# Patient Record
Sex: FEMALE | Race: OTHER | NOT HISPANIC OR LATINO | Employment: UNEMPLOYED | URBAN - METROPOLITAN AREA
[De-identification: names, ages, dates, MRNs, and addresses within clinical notes are randomized per-mention and may not be internally consistent; named-entity substitution may affect disease eponyms.]

---
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
## 2023-01-18 ENCOUNTER — CLINICAL SUPPORT (OUTPATIENT)
Dept: FAMILY MEDICINE CLINIC | Facility: CLINIC | Age: 18
End: 2023-01-18

## 2023-01-18 DIAGNOSIS — Z23 ENCOUNTER FOR IMMUNIZATION: Primary | ICD-10-CM

## 2023-02-09 ENCOUNTER — TELEPHONE (OUTPATIENT)
Dept: FAMILY MEDICINE CLINIC | Facility: CLINIC | Age: 18
End: 2023-02-09

## 2023-02-09 NOTE — TELEPHONE ENCOUNTER
Mom called she took patient to an allergist for the redness around her nose   They said she needs to see dermatology   Could you please put in a referral for dermatology and call the mom when it is done thank you

## 2023-02-10 ENCOUNTER — TELEPHONE (OUTPATIENT)
Dept: FAMILY MEDICINE CLINIC | Facility: CLINIC | Age: 18
End: 2023-02-10

## 2023-02-10 DIAGNOSIS — L53.9 REDNESS OF SKIN: Primary | ICD-10-CM

## 2023-02-10 NOTE — TELEPHONE ENCOUNTER
Spoke with patients mother in regards for referral for a dermatologist and stated patient has redness around her nose and is very concerned and was made aware  Patients referral was also mailed to patients address on file

## 2023-10-11 ENCOUNTER — OFFICE VISIT (OUTPATIENT)
Dept: DERMATOLOGY | Facility: CLINIC | Age: 18
End: 2023-10-11
Payer: COMMERCIAL

## 2023-10-11 VITALS — TEMPERATURE: 98.2 F | HEIGHT: 63 IN | BODY MASS INDEX: 26.93 KG/M2 | WEIGHT: 152 LBS

## 2023-10-11 DIAGNOSIS — R21 RASH: Primary | ICD-10-CM

## 2023-10-11 DIAGNOSIS — L53.9 REDNESS OF SKIN: ICD-10-CM

## 2023-10-11 PROCEDURE — 99244 OFF/OP CNSLTJ NEW/EST MOD 40: CPT | Performed by: STUDENT IN AN ORGANIZED HEALTH CARE EDUCATION/TRAINING PROGRAM

## 2023-10-11 RX ORDER — TACROLIMUS 1 MG/G
OINTMENT TOPICAL 2 TIMES DAILY
Qty: 100 G | Refills: 2 | Status: SHIPPED | OUTPATIENT
Start: 2023-10-11

## 2023-10-11 NOTE — PATIENT INSTRUCTIONS
RASH: Granulosis rubra nasi vs rosaceaMost treatme    Physical Exam:  (Anatomic Location); (Size and Morphological Description); (Differential Diagnosis):  Nasal tip; red plaque that is tender  Pertinent Positives:  Pertinent Negatives: Additional History of Present Condition:  Reports is moist and water appears to come out. Many years present. It is not worse in sun. Pt has autism. Assessment and Plan:  Based on a thorough discussion of this condition and the management approach to it (including a comprehensive discussion of the known risks, side effects and potential benefits of treatment), the patient (family) agrees to implement the following specific plan:  Most treatments do not work well on this condition.   Apply Tacrolimus ointment twice a day to the nasal tip  Call office or my chart message if it gets worse

## 2023-10-11 NOTE — PROGRESS NOTES
[facility] Note     Patient Name: Ranjit Troncoso  Encounter Date: 10/11/23     Have you been cared for by a [facility] D[facility]atologist in the last 3 years and, if so, which description applies to you? NO. I am considered a "new" patient and must complete all patient intake questions. I am FEMALE/of child-bearing potential.    REVIEW OF SYSTEMS:  Have you recently had or currently have any of the following? Recent fever or chills? No  Any non-healing wound? No  Are you pregnant or planning to become pregnant? No  Are you currently or planning to be nursing or breast feeding? No   PAST MEDICAL HISTORY:  Have you personally ever had or currently have any of the following? If "YES," then please provide more detail. Skin cancer (such as Melanoma, Basal Cell Carcinoma, Squamous Cell Carcinoma? No  Tuberculosis, HIV/AIDS, Hepatitis B or C: No  Systemic Immunosuppression such as Diabetes, Biologic or Immunotherapy, Chemotherapy, Organ Transplantation, Bone Marrow Transplantation No  Radiation Treatment No   FAMILY HISTORY:  Any "first degree relatives" (parent, brother, sister, or child) with the following? Skin Cancer, Pancreatic or Other Cancer? No   PATIENT EXPERIENCE:    Do you want the Dermatologist to perform a COMPLETE skin exam today including a clinical examination under the "bra and underwear" areas? NO  If necessary, do we have your permission to call and leave a detailed message on your Preferred Phone number that includes your specific medical information?   Yes      No Known Allergies   Current Outpatient Medications:     benzonatate (TESSALON PERLES) 100 mg capsule, Take 1 capsule (100 mg total) by mouth 3 (three) times a day as needed for cough, Disp: 20 capsule, Rfl: 0    Cholecalciferol (D-5000) 125 MCG (5000 UT) TABS, Take 1 tablet (5,000 Units total) by mouth daily, Disp: 90 tablet, Rfl: 0    clotrimazole-betamethasone (LOTRISONE) 1-0.05 % cream, Apply topically 2 (two) times a day, Disp: 30 g, Rfl: 0    diazePAM (Valtoco 10 MG Dose) 10 MG/0.1ML LIQD, USE 1 SPRAY IN ONE NOSTRIL FOR SEIZURE OVER 5 MINUTES, Disp: , Rfl:     divalproex sodium (DEPAKOTE) 250 mg EC tablet, , Disp: , Rfl:     ferrous sulfate (FeroSul) 325 (65 Fe) mg tablet, Take 1 tablet (325 mg total) by mouth daily with breakfast, Disp: 90 tablet, Rfl: 1    lamoTRIgine (LaMICtal) 150 MG tablet, TAKE 1 TABLET(150 MG) BY MOUTH THREE TIMES DAILY, Disp: , Rfl:     levOCARNitine (CARNITOR) 330 MG tablet, , Disp: , Rfl:     topiramate (TOPAMAX) 100 mg tablet, TAKE 2 AND 1/2 TABLETS BY MOUTH TWICE DAILY, Disp: , Rfl:           Whom besides the patient is providing clinical information about today's encounter? 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 Many years present. It is not worse in sun. Pt has autism. Assessment and Plan:  Based on a thorough discussion of this condition and the management approach to it (including a comprehensive discussion of the known risks, side effects and potential benefits of treatment), the patient (family) agrees to implement the following specific plan:  Most treatments do not work well on this condition.   Apply Tacrolimus ointment twice a day to the nasal tip  Call office or my chart message if condition gets worse or not improved    Scribe Attestation      I,:  Gormania [place], MA am acting as a scribe while in the presence of the attending physician.:       I,:  Anders Du MD personally performed the services described in this documentation    as scribed in my presence.:

## 2023-10-31 ENCOUNTER — OFFICE VISIT (OUTPATIENT)
Dept: FAMILY MEDICINE CLINIC | Facility: CLINIC | Age: 18
End: 2023-10-31
Payer: COMMERCIAL

## 2023-10-31 VITALS
HEIGHT: 63 IN | DIASTOLIC BLOOD PRESSURE: 74 MMHG | TEMPERATURE: 97.5 F | WEIGHT: 154.4 LBS | BODY MASS INDEX: 27.36 KG/M2 | OXYGEN SATURATION: 99 % | SYSTOLIC BLOOD PRESSURE: 106 MMHG | HEART RATE: 60 BPM

## 2023-10-31 DIAGNOSIS — Z00.00 ROUTINE MEDICAL EXAM: Primary | ICD-10-CM

## 2023-10-31 DIAGNOSIS — E55.9 VITAMIN D DEFICIENCY: ICD-10-CM

## 2023-10-31 DIAGNOSIS — R53.83 OTHER FATIGUE: ICD-10-CM

## 2023-10-31 DIAGNOSIS — E78.49 FAMILIAL HYPERLIPIDEMIA, HIGH LDL: ICD-10-CM

## 2023-10-31 DIAGNOSIS — G40.909 SEIZURE DISORDER (HCC): ICD-10-CM

## 2023-10-31 DIAGNOSIS — R73.9 HYPERGLYCEMIA: ICD-10-CM

## 2023-10-31 DIAGNOSIS — Z00.00 ANNUAL PHYSICAL EXAM: ICD-10-CM

## 2023-10-31 DIAGNOSIS — F84.0 AUTISM: ICD-10-CM

## 2023-10-31 PROCEDURE — 99395 PREV VISIT EST AGE 18-39: CPT | Performed by: FAMILY MEDICINE

## 2023-10-31 NOTE — PROGRESS NOTES
ADULT ANNUAL [address] [facility] PRIMARY CARE [place]    NAME: Vinie Holstein  AGE: 19 y.o. SEX: female  : 2005     DATE: 10/31/2023     Assessment and Plan:routeine medical exam and other health conditions     Problem List Items Addressed This Visit          Nervous and Auditory    Seizure disorder ([address])     Stable in  care   of  neurologist  last  seizure  5  years  ago            Other    Hyperglycemia    Relevant Orders    CBC and differential    Comprehensive metabolic panel    Lipid panel    Autism     Is  at her baseline doing well  no new  issues she is  high  functional she is in  school 12th  grade in  the  special  school         Routine medical exam - Primary     Discussed  diet  exercise and life  style  modifications no complaints  no  depression         Familial hyperlipidemia, high LDL     Other Visit Diagnoses       Other fatigue        Relevant Orders    TSH, 3rd generation            Immunizations and preventive care screenings were discussed with patient today. Appropriate education was printed on patient's after visit summary. Counseling:  Dental Health: discussed importance of regular tooth brushing, flossing, and dental visits. No follow-ups on file. Chief Complaint:     Chief Complaint   Patient presents with    Annual Exam      History of Present Illness:     Adult Annual Physical   Patient here for a comprehensive physical exam. The patient reports no problems. Diet and Physical Activity  Diet/Nutrition: well balanced diet, adequate fiber intake, and adequate whole grain intake. Exercise: walking and moderate cardiovascular exercise. Depression Screening  PHQ-2/9 Depression Screening           General Health  Sleep: sleeps well. Hearing: normal - bilateral.  Vision: no vision problems. Dental: regular dental visits.        /GYN Health  Last menstrual period: last period 3 weeks  ago  Contraceptive method:  na .  History of STDs?: no.    Advanced Care Planning  Do you have an advanced directive? no  Do you have a durable medical power of ? no     Review of Systems:     Review of Systems   Constitutional:  Negative for fatigue and fever. Autistic   HENT:  Negative for congestion and sinus pressure. Eyes:  Negative for pain, discharge and itching. Respiratory:  Negative for cough and shortness of breath. Cardiovascular:  Negative for chest pain, palpitations and leg swelling. Hyperlipidemia   Gastrointestinal:  Negative for abdominal distention, abdominal pain and nausea. Endocrine: Negative for polydipsia and polyphagia. Hyperglycemia   Genitourinary:  Negative for dysuria and flank pain. Skin:  Negative for rash. Neurological:         H/o seizures   Psychiatric/Behavioral:  Negative for sleep disturbance. The patient is not nervous/anxious.        Past Medical History:     Past Medical History:   Diagnosis Date    Autistic disorder     Hyperglycemia     Right upper quadrant abdominal pain     Vitamin D deficiency       Past Surgical History:     Past Surgical History:   Procedure Laterality Date    NO PAST SURGERIES        Social History:     Social History     Socioeconomic History    Marital status: Single     Spouse name: None    Number of children: None    Years of education: None    Highest education level: None   Occupational History    None   Tobacco Use    Smoking status: Never    Smokeless tobacco: Never   Vaping Use    Vaping Use: Never used   Substance and Sexual Activity    Alcohol use: Never    Drug use: Never    Sexual activity: None   Other Topics Concern    None   Social History Narrative    · Most recent tobacco use screenin2019      · Do you currently or have you served in the [address] [place] [address] "":   No      · Were you activated, into active duty, as a member of the Crew or as a Reservist:   No     As per [employer]         What type of home do you live in: Single house    Age of your home: 100 yrs    How long have you been living there: 3 yrs    Type of heat: Forced hot air    Type of fuel: Gas    What type of toby is in your bedroom: Hardwood floor    Do you have the following in or near your home:    Air products: Window air conditioning    Pests: None    Pets: None    Basement: Dry and Unfinished              Social Determinants of Health     Financial Resource Strain: Not on file   Food Insecurity: Not on file   Transportation Needs: Not on file   Physical Activity: Insufficiently Active (1/19/2023)    Exercise Vital Sign     Days of Exercise per Week: 2 days     Minutes of Exercise per Session: 30 min   Stress: Not on file   Social Connections: Not on file   Intimate Partner Violence: Not on file   Housing Stability: Not on file      Family History:     Family History   Problem Relation Age of Onset    No Known Problems Mother     Diabetes Father     No Known Problems Sister     No Known Problems Sister       Current Medications:     Current Outpatient Medications   Medication Sig Dispense Refill    clotrimazole-betamethasone (LOTRISONE) 1-0.05 % cream Apply topically 2 (two) times a day 30 g 0    diazePAM (Valtoco 10 MG Dose) 10 MG/0.1ML LIQD USE 1 SPRAY IN ONE NOSTRIL FOR SEIZURE OVER 5 MINUTES      divalproex sodium (DEPAKOTE) 250 mg EC tablet       lamoTRIgine (LaMICtal) 150 MG tablet TAKE 1 TABLET(150 MG) BY MOUTH THREE TIMES DAILY      levOCARNitine (CARNITOR) 330 MG tablet       tacrolimus (PROTOPIC) 0.1 % ointment Apply topically 2 (two) times a day Apply to affected area on nose 100 g 2    topiramate (TOPAMAX) 100 mg tablet TAKE 2 AND 1/2 TABLETS BY MOUTH TWICE DAILY      Cholecalciferol (D-5000) 125 MCG (5000 UT) TABS Take 1 tablet (5,000 Units total) by mouth daily (Patient not taking: Reported on 10/31/2023) 90 tablet 0    ferrous sulfate (FeroSul) 325 (65 Fe) mg tablet Take 1 tablet (325 mg total) by mouth daily with breakfast (Patient not taking: Reported on 10/31/2023) 90 tablet 1     No current facility-administered medications for this visit. Allergies:     No Known Allergies   Physical Exam:     /74 (BP Location: Left arm, Patient Position: Sitting, Cuff Size: Standard)   Pulse 60   Temp 97.5 °F (36.4 °C) (Temporal)   Ht 5' 3" (1.6 m)   Wt 70 kg (154 lb 6.4 oz)   SpO2 99%   BMI 27.35 kg/m²     Physical Exam  Vitals and nursing note reviewed. Constitutional:       General: She is not in acute distress. Appearance: Normal appearance. She is not ill-appearing, toxic-appearing or diaphoretic. HENT:      Right Ear: Tympanic membrane normal.      Left Ear: Tympanic membrane normal.      Nose: Nose normal. No congestion or rhinorrhea. Mouth/Throat:      Mouth: Mucous membranes are moist.   Eyes:      Extraocular Movements: Extraocular movements intact. Conjunctiva/sclera: Conjunctivae normal.      Pupils: Pupils are equal, round, and reactive to light. Cardiovascular:      Rate and Rhythm: Normal rate and regular rhythm. Pulses: Normal pulses. Heart sounds: Normal heart sounds. No murmur heard. No gallop. Pulmonary:      Effort: Pulmonary effort is normal.      Breath sounds: Normal breath sounds. No wheezing, rhonchi or rales. Abdominal:      General: There is no distension. Palpations: Abdomen is soft. There is no mass. Tenderness: There is no abdominal tenderness. There is no right CVA tenderness, left CVA tenderness or guarding. Musculoskeletal:      Cervical back: Normal range of motion and neck supple. No rigidity or tenderness. Right lower leg: No edema. Left lower leg: No edema. Lymphadenopathy:      Cervical: No cervical adenopathy. Skin:     Findings: No erythema or rash. Neurological:      General: No focal deficit present. Mental Status: She is alert and oriented to person, place, and time.    Psychiatric:         Mood and Affect: Mood normal. Behavior: Behavior normal.          Delmis Coffman MD   [facility]

## 2023-10-31 NOTE — PROGRESS NOTES
BMI Counseling: Body mass index is 27.35 kg/m². The BMI is above normal. Nutrition recommendations include decreasing portion sizes, encouraging healthy choices of fruits and vegetables, decreasing fast food intake, consuming healthier snacks, limiting drinks that contain sugar, moderation in carbohydrate intake, increasing intake of lean protein and reducing intake of saturated and trans fat. Rationale for BMI follow-up plan is due to patient being overweight or obese.      Depression Screening and Follow-up Plan: No depression

## 2023-10-31 NOTE — ASSESSMENT & PLAN NOTE
Is  at her baseline doing well  no new  issues she is  high  functional she is in  school 12th  grade in  the  special  school

## 2023-10-31 NOTE — PATIENT INSTRUCTIONS
Wellness Visit for Adults   AMBULATORY CARE:   A wellness visit  is when you see your healthcare provider to get screened for health problems. Your healthcare provider will also give you advice on how to stay healthy. Write down your questions so you remember to ask them. Ask your healthcare provider how often you should have a wellness visit. What happens at a wellness visit:  Your healthcare provider will ask about your health, and your family history of health problems. This includes high blood pressure, heart disease, and cancer. He or she will ask if you have symptoms that concern you, if you smoke, and about your mood. You may also be asked about your intake of medicines, supplements, food, and alcohol. Any of the following may be done: Your weight  will be checked. Your height may also be checked so your body mass index (BMI) can be calculated. Your BMI shows if you are at a healthy weight. Your blood pressure  and heart rate will be checked. Your temperature may also be checked. Blood and urine tests  may be done. Blood tests may be done to check your cholesterol levels. Abnormal cholesterol levels increase your risk for heart disease and stroke. You may also need a blood or urine test to check for diabetes if you are at increased risk. Urine tests may be done to look for signs of an infection or kidney disease. A physical exam  includes checking your heartbeat and lungs with a stethoscope. Your healthcare provider may also check your skin to look for sun damage. Screening tests  may be recommended. A screening test is done to check for diseases that may not cause symptoms. The screening tests you may need depend on your age, gender, family history, and lifestyle habits. For example, colorectal screening may be recommended if you are 48years old or older. Screening tests you need if you are a woman:   A Pap smear  is used to screen for cervical cancer.  Pap smears are usually done every 3 to 5 years depending on your age. You may need them more often if you have had abnormal Pap smear test results in the past. Ask your healthcare provider how often you should have a Pap smear. A mammogram  is an x-ray of your breasts to screen for breast cancer. Experts recommend mammograms every 2 years starting at age 48 years. You may need a mammogram at age 52 years or younger if you have an increased risk for breast cancer. Talk to your healthcare provider about when you should start having mammograms and how often you need them. Vaccines you may need:   Get an influenza vaccine  every year. The influenza vaccine protects you from the flu. Several types of viruses cause the flu. The viruses change over time, so new vaccines are made each year. Get a tetanus-diphtheria (Td) booster vaccine  every 10 years. This vaccine protects you against tetanus and diphtheria. Tetanus is a severe infection that may cause painful muscle spasms and lockjaw. Diphtheria is a severe bacterial infection that causes a thick covering in the back of your mouth and throat. Get a human papillomavirus (HPV) vaccine  if you are female and aged 23 to 32 or male 23 to 24 and never received it. This vaccine protects you from HPV infection. HPV is the most common infection spread by sexual contact. HPV may also cause vaginal, penile, and anal cancers. Get a pneumococcal vaccine  if you are aged 72 years or older. The pneumococcal vaccine is an injection given to protect you from pneumococcal disease. Pneumococcal disease is an infection caused by pneumococcal bacteria. The infection may cause pneumonia, meningitis, or an ear infection. Get a shingles vaccine  if you are 60 or older, even if you have had shingles before. The shingles vaccine is an injection to protect you from the varicella-zoster virus. This is the same virus that causes chickenpox.  Shingles is a painful rash that develops in people who had chickenpox or have been exposed to the virus. How to eat healthy:  My Plate is a model for planning healthy meals. It shows the types and amounts of foods that should go on your plate. Fruits and vegetables make up about half of your plate, and grains and protein make up the other half. A serving of dairy is included on the side of your plate. The amount of calories and serving sizes you need depends on your age, gender, weight, and height. Examples of healthy foods are listed below:  Eat a variety of vegetables  such as dark green, red, and orange vegetables. You can also include canned vegetables low in sodium (salt) and frozen vegetables without added butter or sauces. Eat a variety of fresh fruits , canned fruit in 100% juice, frozen fruit, and dried fruit. Include whole grains. At least half of the grains you eat should be whole grains. Examples include whole-wheat bread, wheat pasta, brown rice, and whole-grain cereals such as oatmeal.    Eat a variety of protein foods such as seafood (fish and shellfish), lean meat, and poultry without skin (turkey and chicken). Examples of lean meats include pork leg, shoulder, or tenderloin, and beef round, sirloin, tenderloin, and extra lean ground beef. Other protein foods include eggs and egg substitutes, beans, peas, soy products, nuts, and seeds. Choose low-fat dairy products such as skim or 1% milk or low-fat yogurt, cheese, and cottage cheese. Limit unhealthy fats  such as butter, hard margarine, and shortening. Exercise:  Exercise at least 30 minutes per day on most days of the week. Some examples of exercise include walking, biking, dancing, and swimming. You can also fit in more physical activity by taking the stairs instead of the elevator or parking farther away from stores. Include muscle strengthening activities 2 days each week. Regular exercise provides many health benefits.  It helps you manage your weight, and decreases your risk for type 2 diabetes, heart disease, stroke, and high blood pressure. Exercise can also help improve your mood. Ask your healthcare provider about the best exercise plan for you. General health and safety guidelines:   Do not smoke. Nicotine and other chemicals in cigarettes and cigars can cause lung damage. Ask your healthcare provider for information if you currently smoke and need help to quit. E-cigarettes or smokeless tobacco still contain nicotine. Talk to your healthcare provider before you use these products. Limit alcohol. A drink of alcohol is 12 ounces of beer, 5 ounces of wine, or 1½ ounces of liquor. Lose weight, if needed. Being overweight increases your risk of certain health conditions. These include heart disease, high blood pressure, type 2 diabetes, and certain types of cancer. Protect your skin. Do not sunbathe or use tanning beds. Use sunscreen with a SPF 15 or higher. Apply sunscreen at least 15 minutes before you go outside. Reapply sunscreen every 2 hours. Wear protective clothing, hats, and sunglasses when you are outside. Drive safely. Always wear your seatbelt. Make sure everyone in your car wears a seatbelt. A seatbelt can save your life if you are in an accident. Do not use your cell phone when you are driving. This could distract you and cause an accident. Pull over if you need to make a call or send a text message. Practice safe sex. Use latex condoms if are sexually active and have more than one partner. Your healthcare provider may recommend screening tests for sexually transmitted infections (STIs). Wear helmets, lifejackets, and protective gear. Always wear a helmet when you ride a bike or motorcycle, go skiing, or play sports that could cause a head injury. Wear protective equipment when you play sports. Wear a lifejacket when you are on a boat or doing water sports.     © Copyright [employer] 2023 Information is for End User's use only and may not be sold, redistributed or otherwise used for commercial purposes. The above information is an  only. It is not intended as medical advice for individual conditions or treatments. Talk to your doctor, nurse or pharmacist before following any medical regimen to see if it is safe and effective for you. Cholesterol and Your Health   AMBULATORY CARE:   Cholesterol  is a waxy, fat-like substance. Your body uses cholesterol to make hormones and new cells, and to protect nerves. Cholesterol is made by your body. It also comes from certain foods you eat, such as meat and dairy products. Your healthcare provider can help you set goals for your cholesterol levels. Your provider can help you create a plan to meet your goals. Cholesterol level goals: Your cholesterol level goals depend on your risk for heart disease, your age, and your other health conditions. The following are general guidelines: Total cholesterol  includes low-density lipoprotein (LDL), high-density lipoprotein (HDL), and triglyceride levels. The total cholesterol level should be lower than 200 mg/dL and is best at about 150 mg/dL. LDL cholesterol  is called bad cholesterol  because it forms plaque in your arteries. As plaque builds up, your arteries become narrow, and less blood flows through. When plaque decreases blood flow to your heart, you may have chest pain. If plaque completely blocks an artery that brings blood to your heart, you may have a heart attack. Plaque can break off and form blood clots. Blood clots may block arteries in your brain and cause a stroke. The level should be less than 130 mg/dL and is best at about 100 mg/dL. HDL cholesterol  is called good cholesterol  because it helps remove LDL cholesterol from your arteries. It does this by attaching to LDL cholesterol and carrying it to your liver. Your liver breaks down LDL cholesterol so your body can get rid of it.  High levels of HDL cholesterol can help prevent a heart attack and stroke. Low levels of HDL cholesterol can increase your risk for heart disease, heart attack, and stroke. The level should be at least 40 mg/dL in males or at least 50 mg/dL in females. Triglycerides  are a type of fat that store energy from foods you eat. High levels of triglycerides also cause plaque buildup. This can increase your risk for a heart attack or stroke. If your triglyceride level is high, your LDL cholesterol level may also be high. The level should be less than 150 mg/dL. Any of the following can increase your risk for high cholesterol:   Smoking or drinking large amounts of alcohol    Having overweight or obesity, or not getting enough exercise    A medical condition such as hypertension (high blood pressure) or diabetes    A family history of high cholesterol    Age older than 72    What you need to know about having your cholesterol levels checked: Adults 21to 39years of age should have their cholesterol levels checked every 4 to 6 years. Adults 45 years or older should have their cholesterol checked every 1 to 2 years. You may need your cholesterol checked more often, or at a younger age, if you have risk factors for heart disease. You may also need to have your cholesterol checked more often if you have other health conditions, such as diabetes. Blood tests are used to check cholesterol levels. Blood tests measure your levels of triglycerides, LDL cholesterol, and HDL cholesterol. How healthy fats affect your cholesterol levels:  Healthy fats, also called unsaturated fats, help lower LDL cholesterol and triglyceride levels. Healthy fats include the following:  Monounsaturated fats  are found in foods such as olive oil, canola oil, avocado, nuts, and olives. Polyunsaturated fats,  such as omega 3 fats, are found in fish, such as salmon, trout, and tuna. They can also be found in plant foods such as flaxseed, walnuts, and soybeans.     How unhealthy fats affect your cholesterol levels: Unhealthy fats increase LDL cholesterol and triglyceride levels. They are found in foods high in cholesterol, saturated fat, and trans fat:  Cholesterol  is found in eggs, dairy, and meat. Saturated fat  is found in butter, cheese, ice cream, whole milk, and coconut oil. Saturated fat is also found in meat, such as sausage, hot dogs, and bologna. Trans fat  is found in liquid oils and is used in fried and baked foods. Foods that contain trans fats include chips, crackers, muffins, sweet rolls, microwave popcorn, and cookies. Treatment  for high cholesterol will also decrease your risk of heart disease, heart attack, and stroke. Treatment may include any of the following:  Lifestyle changes  may include food, exercise, weight loss, and quitting smoking. You may also need to decrease the amount of alcohol you drink. Your healthcare provider will want you to start with lifestyle changes. Other treatment may be added if lifestyle changes are not enough. Your healthcare provider may recommend you work with a team to manage hyperlipidemia. The team may include medical experts such as a dietitian, an exercise or physical therapist, and a behavior therapist. Your family members may be included in helping you create lifestyle changes. Medicines  may be given to lower your LDL cholesterol, triglyceride levels, or total cholesterol level. You may need medicines to lower your cholesterol if any of the following is true:    You have a history of stroke, TIA, unstable angina, or a heart attack. Your LDL cholesterol level is 190 mg/dL or higher. You are age 36 to 76 years, have diabetes or heart disease risk factors, and your LDL cholesterol is 70 mg/dL or higher. Supplements  include fish oil, red yeast rice, and garlic. Fish oil may help lower your triglyceride and LDL cholesterol levels. It may also increase your HDL cholesterol level.  Red yeast rice may help decrease your total cholesterol level and LDL cholesterol level. Garlic may help lower your total cholesterol level. Do not take any supplements without talking to your healthcare provider. Food changes you can make to lower your cholesterol levels:  A dietitian can help you create a healthy eating plan. Your dietitian can show you how to read food labels and choose foods low in saturated fat, trans fats, and cholesterol. Decrease the total amount of fat you eat. Choose lean meats, fat-free or 1% fat milk, and low-fat dairy products, such as yogurt and cheese. Try to limit or avoid red meats. Limit or do not eat fried foods or baked goods, such as cookies. Replace unhealthy fats with healthy fats. Cook foods in olive oil or canola oil. Choose soft margarines that are low in saturated fat and trans fat. Seeds, nuts, and avocados are other examples of healthy fats. Eat foods with omega-3 fats. Examples include salmon, tuna, mackerel, walnuts, and flaxseed. Eat fish 2 times per week. Pregnant women should not eat fish that have high levels of mercury, such as shark, swordfish, and tavia mackerel. Increase the amount of high-fiber foods you eat. High-fiber foods can help lower your LDL cholesterol. Aim to get between 20 and 30 grams of fiber each day. Fruits and vegetables are high in fiber. Eat at least 5 servings each day. Other high-fiber foods are whole-grain or whole-wheat breads, pastas, or cereals, and brown rice. Eat 3 ounces of whole-grain foods each day. Increase fiber slowly. You may have abdominal discomfort, bloating, and gas if you add fiber to your diet too quickly. Eat healthy protein foods. Examples include low-fat dairy products, skinless chicken and turkey, fish, and nuts. Limit foods and drinks that are high in sugar. Your dietitian or healthcare provider can help you create daily limits for high-sugar foods and drinks. The limit may be lower if you have diabetes or another health condition.  Limits can also help you lose weight if needed. Lifestyle changes you can make to lower your cholesterol levels:   Maintain a healthy weight. Ask your healthcare provider what a healthy weight is for you. Ask your provider to help you create a weight loss plan if needed. Weight loss can decrease your total cholesterol and triglyceride levels. Weight loss may also help keep your blood pressure at a healthy level. Be physically active throughout the day. Physical activity, such as exercise, can help lower your total cholesterol level and maintain a healthy weight. Physical activity can also help increase your HDL cholesterol level. Work with your healthcare provider to create an program that is right for you. Get at least 30 to 40 minutes of moderate physical activity most days of the week. Examples of exercise include brisk walking, swimming, or biking. Also include strength training at least 2 times each week. Your healthcare providers can help you create a physical activity plan. Do not smoke. Nicotine and other chemicals in cigarettes and cigars can raise your cholesterol levels. Ask your healthcare provider for information if you currently smoke and need help to quit. E-cigarettes or smokeless tobacco still contain nicotine. Talk to your healthcare provider before you use these products. Limit or do not drink alcohol. Alcohol can increase your triglyceride levels. Ask your healthcare provider before you drink alcohol. Ask how much is okay for you to drink in 24 hours or 1 week. Follow up with your doctor as directed:  Write down your questions so you remember to ask them during your visits. © Copyright [employer] 2023 Information is for End User's use only and may not be sold, redistributed or otherwise used for commercial purposes. The above information is an  only. It is not intended as medical advice for individual conditions or treatments.  Talk to your doctor, nurse or pharmacist before following any medical regimen to see if it is safe and effective for you.

## 2023-11-03 NOTE — PROGRESS NOTES
Virtual Regular Visit    Verification of patient location:    Patient is located in the following state in which I hold an active license [place]      Assessment/Plan:as  Below -    Problem List Items Addressed This Visit        Other    COVID-19 virus detected - Primary     She   Was  Tested  Positive   By  The  Home  Test  2  Days   Ago  She   Is  C/o  Mild  Body  Aches  And  Cough  No  Fever  Sob or  Cp    She is  Eating  Well  No  Gi  Symptoms   Discussed  Precautions  And  Care    Supportive  And  Symptomatic  Treatment   Return  Parameters  Discussed          Cough     Discussed   Supportive  Treatment   Started  On  z  Pack  And  tessalon  parls   Return  Parameters  discussed                    Reason for visit is   Chief Complaint   Patient presents with    COVID-19    Virtual Regular Visit        Encounter provider Roselyn Recinos MD    Provider located at [address] [place] [address] [address] [facility] [address] [phone]      Recent Visits  No visits were found meeting these conditions  Showing recent visits within past 7 days and meeting all other requirements  Today's Visits  Date Type Provider Dept   01/11/22 Telemedicine Roselyn Recinos MD [address] today's visits and meeting all other requirements  Future Appointments  No visits were found meeting these conditions  Showing future appointments within next 150 days and meeting all other requirements       The patient was identified by name and date of birth  Markie Pena was informed that this is a telemedicine visit and that the visit is being conducted through [address] [place] [address] Now and patient was informed that this is a secure, HIPAA-compliant platform  She agrees to proceed     My office door was closed  No one else was in the room  She acknowledged consent and understanding of privacy and security of the video platform   The patient has agreed to participate and understands they can discontinue the visit at any time  Patient is aware this is a billable service  Subjective  Maci Bey is a 23 y o  female video  visit  HPI   I  Had  The  Video  Visit  With  Her  And   Her  Parents    For    Having   Myalgia  And  Cough    She  Denies   Any  Fever  Sob  Or  Wheezing   Eating  Well   No GI  Symptoms   She  Is  Been  Diagnosed   With  covid  On  The  Home  Test   Her  Whole  Family  Is  Positive   But  Stable   Past Medical History:   Diagnosis Date    Autistic disorder     Hyperglycemia     Right upper quadrant abdominal pain     Vitamin D deficiency        Past Surgical History:   Procedure Laterality Date    NO PAST SURGERIES         Current Outpatient Medications   Medication Sig Dispense Refill    Cholecalciferol (D-5000) 125 MCG (5000 UT) TABS Take 1 tablet (5,000 Units total) by mouth daily (Patient not taking: Reported on 12/27/2021 ) 90 tablet 0    clotrimazole-betamethasone (LOTRISONE) 1-0 05 % cream Apply topically 2 (two) times a day 30 g 0    divalproex sodium (DEPAKOTE) 250 mg EC tablet  (Patient not taking: Reported on 11/29/2021 )      divalproex sodium (DEPAKOTE) 500 mg EC tablet       docusate sodium (COLACE) 100 mg capsule TAKE 1 CAPSULE BY MOUTH TWICE A DAY 60 capsule 0    FeroSul 325 (65 Fe) MG tablet TAKE 1 TABLET(325 MG TOTAL) BY MOUTH TWICE DAILY 166 tablet 0    folic acid (FOLVITE) 1 mg tablet       lamoTRIgine (LaMICtal) 150 MG tablet TAKE 1 TABLET(150 MG) BY MOUTH THREE TIMES DAILY      levOCARNitine (CARNITOR) 330 MG tablet       potassium chloride (MICRO-K) 10 MEQ CR capsule Take 1 capsule (10 mEq total) by mouth daily for 5 days (Patient not taking: Reported on 12/27/2021 ) 5 capsule 0    topiramate (TOPAMAX) 100 mg tablet TAKE 2 AND 1/2 TABLETS BY MOUTH TWICE DAILY       No current facility-administered medications for this visit  No Known Allergies    Review of Systems   Constitutional: Positive for fatigue   Negative for fever    HENT: Negative for congestion, postnasal drip and sinus pain  Eyes: Negative for discharge and itching  Respiratory: Positive for cough  Negative for shortness of breath and wheezing  Cardiovascular: Negative for chest pain, palpitations and leg swelling  Gastrointestinal: Negative for abdominal distention, abdominal pain, diarrhea and nausea  Endocrine: Negative for cold intolerance and heat intolerance  Genitourinary: Negative for dysuria, flank pain and pelvic pain  Musculoskeletal: Negative for arthralgias  Skin: Negative for rash  Video Exam    Vitals:    01/11/22 1856   Weight: 68 kg (150 lb)   Height: 5' 2 75" (1 594 m)       Physical Exam  Constitutional:       General: She is not in acute distress  Appearance: Normal appearance  She is not ill-appearing, toxic-appearing or diaphoretic  HENT:      Head: Normocephalic and atraumatic  Nose: Nose normal       Mouth/Throat:      Mouth: Mucous membranes are moist    Pulmonary:      Effort: Pulmonary effort is normal    Musculoskeletal:      Cervical back: Normal range of motion  Skin:     Findings: No erythema or rash  Neurological:      General: No focal deficit present  Mental Status: She is alert and oriented to person, place, and time  Psychiatric:         Mood and Affect: Mood normal          Behavior: Behavior normal          Thought Content: Thought content normal           I spent 30 minutes directly with the patient during this visit    VIRTUAL VISIT DISCLAIMER      Harmony Marcelo verbally agrees to participate in [employer]  Pt is aware that [employer] could be limited without vital signs or the ability to perform a full hands-on physical Eber Balta understands she or the provider may request at any time to terminate the video visit and request the patient to seek care or treatment in person  none

## 2024-02-21 PROBLEM — Z00.00 ROUTINE MEDICAL EXAM: Status: RESOLVED | Noted: 2020-09-09 | Resolved: 2024-02-21

## 2024-04-27 ENCOUNTER — APPOINTMENT (OUTPATIENT)
Dept: LAB | Facility: HOSPITAL | Age: 19
End: 2024-04-27
Payer: COMMERCIAL

## 2024-04-27 DIAGNOSIS — R53.83 OTHER FATIGUE: ICD-10-CM

## 2024-04-27 DIAGNOSIS — R73.9 HYPERGLYCEMIA: ICD-10-CM

## 2024-04-27 DIAGNOSIS — E55.9 VITAMIN D DEFICIENCY: ICD-10-CM

## 2024-04-27 LAB
25(OH)D3 SERPL-MCNC: 31.6 NG/ML (ref 30–100)
ALBUMIN SERPL BCP-MCNC: 4.5 G/DL (ref 3.5–5)
ALP SERPL-CCNC: 38 U/L (ref 34–104)
ALT SERPL W P-5'-P-CCNC: 7 U/L (ref 7–52)
ANION GAP SERPL CALCULATED.3IONS-SCNC: 8 MMOL/L (ref 4–13)
AST SERPL W P-5'-P-CCNC: 10 U/L (ref 13–39)
BASOPHILS # BLD AUTO: 0.04 THOUSANDS/ÂΜL (ref 0–0.1)
BASOPHILS NFR BLD AUTO: 1 % (ref 0–1)
BILIRUB SERPL-MCNC: 0.28 MG/DL (ref 0.2–1)
BUN SERPL-MCNC: 12 MG/DL (ref 5–25)
CALCIUM SERPL-MCNC: 9.5 MG/DL (ref 8.4–10.2)
CHLORIDE SERPL-SCNC: 109 MMOL/L (ref 96–108)
CHOLEST SERPL-MCNC: 127 MG/DL
CO2 SERPL-SCNC: 21 MMOL/L (ref 21–32)
CREAT SERPL-MCNC: 0.72 MG/DL (ref 0.6–1.3)
EOSINOPHIL # BLD AUTO: 0.08 THOUSAND/ÂΜL (ref 0–0.61)
EOSINOPHIL NFR BLD AUTO: 1 % (ref 0–6)
ERYTHROCYTE [DISTWIDTH] IN BLOOD BY AUTOMATED COUNT: 12.8 % (ref 11.6–15.1)
EST. AVERAGE GLUCOSE BLD GHB EST-MCNC: 111 MG/DL
GFR SERPL CREATININE-BSD FRML MDRD: 120 ML/MIN/1.73SQ M
GLUCOSE P FAST SERPL-MCNC: 88 MG/DL (ref 65–99)
HBA1C MFR BLD: 5.5 %
HCT VFR BLD AUTO: 41.7 % (ref 34.8–46.1)
HDLC SERPL-MCNC: 44 MG/DL
HGB BLD-MCNC: 13.3 G/DL (ref 11.5–15.4)
IMM GRANULOCYTES # BLD AUTO: 0.02 THOUSAND/UL (ref 0–0.2)
IMM GRANULOCYTES NFR BLD AUTO: 0 % (ref 0–2)
LDLC SERPL CALC-MCNC: 66 MG/DL (ref 0–100)
LYMPHOCYTES # BLD AUTO: 2.63 THOUSANDS/ÂΜL (ref 0.6–4.47)
LYMPHOCYTES NFR BLD AUTO: 37 % (ref 14–44)
MCH RBC QN AUTO: 28.7 PG (ref 26.8–34.3)
MCHC RBC AUTO-ENTMCNC: 31.9 G/DL (ref 31.4–37.4)
MCV RBC AUTO: 90 FL (ref 82–98)
MONOCYTES # BLD AUTO: 0.45 THOUSAND/ÂΜL (ref 0.17–1.22)
MONOCYTES NFR BLD AUTO: 6 % (ref 4–12)
NEUTROPHILS # BLD AUTO: 3.86 THOUSANDS/ÂΜL (ref 1.85–7.62)
NEUTS SEG NFR BLD AUTO: 55 % (ref 43–75)
NONHDLC SERPL-MCNC: 83 MG/DL
NRBC BLD AUTO-RTO: 0 /100 WBCS
PLATELET # BLD AUTO: 187 THOUSANDS/UL (ref 149–390)
PMV BLD AUTO: 11.1 FL (ref 8.9–12.7)
POTASSIUM SERPL-SCNC: 3.5 MMOL/L (ref 3.5–5.3)
PROT SERPL-MCNC: 7.3 G/DL (ref 6.4–8.4)
RBC # BLD AUTO: 4.64 MILLION/UL (ref 3.81–5.12)
SODIUM SERPL-SCNC: 138 MMOL/L (ref 135–147)
TRIGL SERPL-MCNC: 87 MG/DL
TSH SERPL DL<=0.05 MIU/L-ACNC: 2.47 UIU/ML (ref 0.45–4.5)
WBC # BLD AUTO: 7.08 THOUSAND/UL (ref 4.31–10.16)

## 2024-04-27 PROCEDURE — 84443 ASSAY THYROID STIM HORMONE: CPT

## 2024-04-27 PROCEDURE — 36415 COLL VENOUS BLD VENIPUNCTURE: CPT

## 2024-04-27 PROCEDURE — 82306 VITAMIN D 25 HYDROXY: CPT

## 2024-04-27 PROCEDURE — 85025 COMPLETE CBC W/AUTO DIFF WBC: CPT

## 2024-04-27 PROCEDURE — 83036 HEMOGLOBIN GLYCOSYLATED A1C: CPT

## 2024-04-27 PROCEDURE — 80053 COMPREHEN METABOLIC PANEL: CPT

## 2024-04-27 PROCEDURE — 80061 LIPID PANEL: CPT

## 2024-05-14 ENCOUNTER — OFFICE VISIT (OUTPATIENT)
Dept: FAMILY MEDICINE CLINIC | Facility: CLINIC | Age: 19
End: 2024-05-14
Payer: COMMERCIAL

## 2024-05-14 VITALS
BODY MASS INDEX: 27.35 KG/M2 | TEMPERATURE: 97.3 F | OXYGEN SATURATION: 98 % | HEART RATE: 80 BPM | SYSTOLIC BLOOD PRESSURE: 108 MMHG | DIASTOLIC BLOOD PRESSURE: 66 MMHG | WEIGHT: 154.4 LBS

## 2024-05-14 DIAGNOSIS — E55.9 VITAMIN D DEFICIENCY: ICD-10-CM

## 2024-05-14 DIAGNOSIS — G40.909 SEIZURE DISORDER (HCC): Primary | ICD-10-CM

## 2024-05-14 DIAGNOSIS — E78.49 FAMILIAL HYPERLIPIDEMIA, HIGH LDL: ICD-10-CM

## 2024-05-14 DIAGNOSIS — D50.9 IRON DEFICIENCY ANEMIA, UNSPECIFIED IRON DEFICIENCY ANEMIA TYPE: ICD-10-CM

## 2024-05-14 DIAGNOSIS — R73.9 HYPERGLYCEMIA: ICD-10-CM

## 2024-05-14 DIAGNOSIS — F84.0 AUTISM: ICD-10-CM

## 2024-05-14 PROCEDURE — 99214 OFFICE O/P EST MOD 30 MIN: CPT | Performed by: FAMILY MEDICINE

## 2024-05-14 NOTE — PROGRESS NOTES
Assessment/Plan:as  below         Problem List Items Addressed This Visit          Nervous and Auditory    Seizure disorder (HCC) - Primary     Stable  she  is  in care of  neurologist            Behavioral Health    Autism     At her baseline doing well goes  to  special  school             Blood    Iron deficiency anemia     Reviewed  stable            Other    Hyperglycemia     A1c 5.5         Vitamin D deficiency     normal         Familial hyperlipidemia, high LDL     labs reviewed   discussed  diet  exorcise  and life  style  modifications               Subjective:      Patient ID: Nicole Murry is a 18 y.o. female.    HPI  Came in  with her mother to  follow up on her labs    her  lipids   glucose  and  hb  is  normal now  she  ha s hyperglycemia a nd  hyperlipidemia  much  stable now  autism at  baseline  she  is  high  functioning   she is in  acre of  dermatologist  for her  skin pigentation  The following portions of the patient's history were reviewed and updated as appropriate:   Past Medical History:  She has a past medical history of Autistic disorder, Hyperglycemia, Right upper quadrant abdominal pain, and Vitamin D deficiency.,  _______________________________________________________________________  Medical Problems:  does not have any pertinent problems on file.,  _______________________________________________________________________  Past Surgical History:   has a past surgical history that includes No past surgeries.,  _______________________________________________________________________  Family History:  family history includes Diabetes in her father; No Known Problems in her mother, sister, and sister.,  _______________________________________________________________________  Social History:   reports that she has never smoked. She has never used smokeless tobacco. She reports that she does not drink alcohol and does not use  drugs.,  _______________________________________________________________________  Allergies:  has No Known Allergies..  _______________________________________________________________________  Current Outpatient Medications   Medication Sig Dispense Refill    clotrimazole-betamethasone (LOTRISONE) 1-0.05 % cream Apply topically 2 (two) times a day 30 g 0    diazePAM (Valtoco 10 MG Dose) 10 MG/0.1ML LIQD USE 1 SPRAY IN ONE NOSTRIL FOR SEIZURE OVER 5 MINUTES      divalproex sodium (DEPAKOTE) 250 mg EC tablet       lamoTRIgine (LaMICtal) 150 MG tablet TAKE 1 TABLET(150 MG) BY MOUTH THREE TIMES DAILY      levOCARNitine (CARNITOR) 330 MG tablet       tacrolimus (PROTOPIC) 0.1 % ointment Apply topically 2 (two) times a day Apply to affected area on nose 100 g 2    topiramate (TOPAMAX) 100 mg tablet TAKE 2 AND 1/2 TABLETS BY MOUTH TWICE DAILY      Cholecalciferol (D-5000) 125 MCG (5000 UT) TABS Take 1 tablet (5,000 Units total) by mouth daily (Patient not taking: Reported on 10/31/2023) 90 tablet 0    ferrous sulfate (FeroSul) 325 (65 Fe) mg tablet Take 1 tablet (325 mg total) by mouth daily with breakfast (Patient not taking: Reported on 10/31/2023) 90 tablet 1     No current facility-administered medications for this visit.     _______________________________________________________________________  Review of Systems   Constitutional:  Negative for fatigue and fever.   HENT:  Negative for congestion and postnasal drip.    Eyes:  Negative for pain, discharge and itching.   Respiratory:  Negative for cough and shortness of breath.    Cardiovascular:  Negative for chest pain, palpitations and leg swelling.        Hyperlipidemia  stable   Endocrine: Negative for cold intolerance, heat intolerance and polydipsia.        Anemia  stable hyperglycemia  stable   Genitourinary:  Negative for dysuria and flank pain.   Musculoskeletal:  Negative for arthralgias and back pain.   Skin:         Pigmentation on nose    Neurological:   Negative for dizziness and headaches.   Psychiatric/Behavioral:  Negative for sleep disturbance. The patient is not nervous/anxious.          Objective:  Vitals:    05/14/24 1838   BP: 108/66   BP Location: Left arm   Patient Position: Sitting   Cuff Size: Standard   Pulse: 80   Temp: (!) 97.3 °F (36.3 °C)   TempSrc: Temporal   SpO2: 98%   Weight: 70 kg (154 lb 6.4 oz)     Body mass index is 27.35 kg/m².     Physical Exam  Vitals and nursing note reviewed.   Constitutional:       General: She is not in acute distress.     Appearance: Normal appearance. She is not ill-appearing or toxic-appearing.   HENT:      Head: Normocephalic.      Nose: Nose normal. No congestion.      Mouth/Throat:      Mouth: Mucous membranes are moist.      Pharynx: Oropharynx is clear. No oropharyngeal exudate.   Eyes:      Extraocular Movements: Extraocular movements intact.      Conjunctiva/sclera: Conjunctivae normal.      Pupils: Pupils are equal, round, and reactive to light.   Cardiovascular:      Rate and Rhythm: Normal rate and regular rhythm.      Pulses: Normal pulses.      Heart sounds: Normal heart sounds. No murmur heard.     No gallop.   Pulmonary:      Effort: Pulmonary effort is normal. No respiratory distress.      Breath sounds: Normal breath sounds. No wheezing or rhonchi.   Abdominal:      General: There is no distension.      Palpations: Abdomen is soft. There is no mass.      Tenderness: There is no abdominal tenderness. There is no rebound.   Musculoskeletal:      Cervical back: Normal range of motion and neck supple. No rigidity or tenderness.      Right lower leg: No edema.      Left lower leg: No edema.   Lymphadenopathy:      Cervical: No cervical adenopathy.   Skin:     Findings: No erythema or rash.   Neurological:      General: No focal deficit present.      Mental Status: She is alert and oriented to person, place, and time.   Psychiatric:         Mood and Affect: Mood normal.         Behavior: Behavior normal.          Thought Content: Thought content normal.

## 2024-11-06 ENCOUNTER — OFFICE VISIT (OUTPATIENT)
Dept: DERMATOLOGY | Facility: CLINIC | Age: 19
End: 2024-11-06
Payer: COMMERCIAL

## 2024-11-06 VITALS — BODY MASS INDEX: 27.21 KG/M2 | WEIGHT: 153.6 LBS | TEMPERATURE: 98.4 F | HEIGHT: 63 IN

## 2024-11-06 DIAGNOSIS — L53.9 REDNESS OF SKIN: Primary | ICD-10-CM

## 2024-11-06 DIAGNOSIS — R21 RASH: ICD-10-CM

## 2024-11-06 DIAGNOSIS — L70.9 ACNE, UNSPECIFIED ACNE TYPE: ICD-10-CM

## 2024-11-06 PROCEDURE — 99214 OFFICE O/P EST MOD 30 MIN: CPT | Performed by: STUDENT IN AN ORGANIZED HEALTH CARE EDUCATION/TRAINING PROGRAM

## 2024-11-06 RX ORDER — TRETINOIN 0.25 MG/G
CREAM TOPICAL
Status: CANCELLED | OUTPATIENT
Start: 2024-11-06

## 2024-11-06 RX ORDER — CLINDAMYCIN PHOSPHATE 10 UG/ML
LOTION TOPICAL
Qty: 60 ML | Refills: 5 | Status: SHIPPED | OUTPATIENT
Start: 2024-11-06

## 2024-11-06 NOTE — PATIENT INSTRUCTIONS
RASH: Granulosis rubra nasi + acne          Assessment and Plan:  Based on a thorough discussion of this condition and the management approach to it (including a comprehensive discussion of the known risks, side effects and potential benefits of treatment), the patient (family) agrees to implement the following specific plan:  Most treatments do not work well on this condition.  Apply aluminum chloride to the nose twice a day  Apply clobetasol lotion to Areas affected by acne   Neutrogena Salicylic face wash can be purchased Over the counter  Over the counter Differin Gel. Apply a pea size amount to the face.   Contact us if things do not improve

## 2024-11-06 NOTE — PROGRESS NOTES
"[facility] Note     Patient Name: Nicole Murry  Encounter Date: 11/6/2024     Have you been cared for by a [facility] Dermatologist in the last 3 years and, if so, which description applies to you?    Yes.  I have been here within the last 3 years, and my medical history has NOT changed since that time.  I am FEMALE/of child-bearing potential.    REVIEW OF SYSTEMS:  Have you recently had or currently have any of the following? No changes in my recent health.   PAST MEDICAL HISTORY:  Have you personally ever had or currently have any of the following?  If \"YES,\" then please provide more detail. No changes in my medical history.   HISTORY OF IMMUNOSUPPRESSION: Do you have a history of any of the following:  Systemic Immunosuppression such as Diabetes, Biologic or Immunotherapy, Chemotherapy, Organ Transplantation, Bone Marrow Transplantation or Prednisone?  No     Answering \"YES\" requires the addition of the dotphrase \"IMMUNOSUPPRESSED\" as the first diagnosis of the patient's visit.   FAMILY HISTORY:  Any \"first degree relatives\" (parent, brother, sister, or child) with the following?    No changes in my family's known health.   PATIENT EXPERIENCE:    Do you want the Dermatologist to perform a COMPLETE skin exam today including a clinical examination under the \"bra and underwear\" areas?  NO  If necessary, do we have your permission to call and leave a detailed message on your Preferred Phone number that includes your specific medical information?  Yes      No Known Allergies   Current Outpatient Medications:     Cholecalciferol (D-5000) 125 MCG (5000 UT) TABS, Take 1 tablet (5,000 Units total) by mouth daily (Patient not taking: Reported on 10/31/2023), Disp: 90 tablet, Rfl: 0    clotrimazole-betamethasone (LOTRISONE) 1-0.05 % cream, Apply topically 2 (two) times a day, Disp: 30 g, Rfl: 0    diazePAM (Valtoco 10 MG Dose) 10 MG/0.1ML LIQD, USE 1 SPRAY IN ONE NOSTRIL FOR SEIZURE OVER 5 MINUTES, Disp: " , Rfl:     divalproex sodium (DEPAKOTE) 250 mg EC tablet, , Disp: , Rfl:     ferrous sulfate (FeroSul) 325 (65 Fe) mg tablet, Take 1 tablet (325 mg total) by mouth daily with breakfast (Patient not taking: Reported on 10/31/2023), Disp: 90 tablet, Rfl: 1    lamoTRIgine (LaMICtal) 150 MG tablet, TAKE 1 TABLET(150 MG) BY MOUTH THREE TIMES DAILY, Disp: , Rfl:     levOCARNitine (CARNITOR) 330 MG tablet, , Disp: , Rfl:     tacrolimus (PROTOPIC) 0.1 % ointment, Apply topically 2 (two) times a day Apply to affected area on nose, Disp: 100 g, Rfl: 2    topiramate (TOPAMAX) 100 mg tablet, TAKE 2 AND 1/2 TABLETS BY MOUTH TWICE DAILY, Disp: , Rfl:           Whom besides the patient is providing clinical information about today's encounter?     Spouse/Guardian provided history (patient is a poor historian)    Physical Exam and Assessment/Plan by Diagnosis:      RASH: Granulosis rubra nasi     Physical Exam:  (Anatomic Location); (Size and Morphological Description); (Differential Diagnosis):  Nasal tip; red patch that is tender  Pertinent Positives:  Pertinent Negatives:     Additional History of Present Condition:  Reports redness and tenderness of nose. Also notes gets very sweaty. Has been present for years, patient's mother noting it maybe appeared around age 8. It is not worse in sun. Pt has autism. Patient mom says the Tacrolimus ointment was used for 2 months but they stopped because were not seeing improvement.     Assessment and Plan:  Based on a thorough discussion of this condition and the management approach to it (including a comprehensive discussion of the known risks, side effects and potential benefits of treatment), the patient (family) agrees to implement the following specific plan:  Most treatments do not work well on this condition.  Apply aluminum chloride to the nose twice a day    ACNE VULGARIS    Physical Exam:  Anatomic Locations Involved: Face  Global Assessment: ALMOST CLEAR: A few scattered  "comedones and a few small inflammatory papules.   Scarring Present? NONE  Pertinent Positives:  Pertinent Negatives:    Additional History of Present Condition:  patient's mother notes pimples come and go, especially on patient's cheeks. It is bothersome for patient.       TODAY'S PLAN:     PRESCRIPTION MANAGEMENT:  Several treatment options were discussed including topical retinoids and their side effects.     Skin Hygiene:      Wash affected areas (face, chest, and back) TWICE A DAY with a mild cleanser such as CeraVe.  Use only mild cleansers (hypoallergenic and without fragrances) and fragrance free detergent (not \"unscented\" products which contain a masking agent); we discussed avoiding irritants/fragranced products.  Apply a good oil-free facial moisturizer AT LEAST TWO TIMES A DAY \" such as CeraVe.  Minimize the application of oils and cosmetics to the affected skin.  This includes HAIR PRODUCTS such as \"leave in\" conditioners.  Unless the product specifically states that it \"won't cause acne,\" \"won't clog pores,\" and/or \"is non-comedogenic\" then it may actually CAUSE acne.  If you smoke, STOP. Nicotine increases sebum retention and increased scale within the follicles, forming comedones (blackheads and whiteheads).  Abrasive treatments such as dermabrasion and spa facials may aggravate inflammatory acne.  Do NOT scratch or pick your acne bumps.  The evidence that diet directly affects acne remains weak.  However, diet does affect your overall health.  Eat plenty of fresh fruit and vegetables.  Avoid protein or amino acid supplements, particularly if they contain leucine. Consider a low-glycemic, low-protein and low-dairy diet.  Be mindful that certain medications may cause of aggravate acne.  Make sure to tell your Dermatologist if you start a new prescription, nutritional supplement, and/or herbal remedy.      MORNING Topical Regimen:      Clindamycin 1% lotion IN THE MORNING:  After gently washing and " "drying your skin, apply this TOPICAL medication evenly over your entire face, avoiding the eyes and corners of the mouth  Salicylic Acid OTC wash, such as Neutrogena.  IN THE MORNING:  After gently washing and drying your skin, apply this TOPICAL medication evenly over your entire face, avoiding the eyes and corners of the mouth      EVENING Topical Regimen:      Adapalene 0.1% gel (available \"over-the-counter;\" usually under $35) AT LEAST 1 HOUR BEFORE BEDTIME:  Evenly spread a SINGLE pea-sized amount of this medication over your entire face, avoiding the eyes and corners of the mouth.      SYSTEMIC Strategies:      NONE        MEDICAL DECISION MAKING  Treatment Goal:  Resolution of the CHRONIC condition.       Chronic condition is NOT at treatment goal.  It is progressing along its expected course OR is poorly-controlled.          Shalini Gomez MD  Dermatology, PGY-2  Scribe Attestation      I,:  Alli Nazario am acting as a scribe while in the presence of the attending physician.:       I,:  Aidan Medina MD personally performed the services described in this documentation    as scribed in my presence.:            This encounter ([id number] or [id number]) has a MODERATE level of medical decision making (MDM) given the presence of at least 2 of the elements of MDM (below):  *MODERATE number and complexity of problems addressed: 1 or more chronic illnesses with exacerbation, progression, or side effects of treatment; OR 2 or more stable chronic illnesses; OR 1 undiagnosed new problem with uncertain prognosis; OR 1 acute illness with systemic symptoms; OR 1 acute uncomplicated injury  *MODERATE amount and/or complexity of data reviewed: this includes review of previous notes and/or lab tests, independent interpretation of tests performed by another healthcare professional, ordering tests, assessment requiring independent historian, or discussion with other healthcare professionals.  *MODERATE risk of complications and/or " morbidity or mortality of patient management (for example, prescription drug management, decisions regarding minor surgery with identified patient or procedure risk factors).

## 2025-02-06 ENCOUNTER — OFFICE VISIT (OUTPATIENT)
Dept: FAMILY MEDICINE CLINIC | Facility: CLINIC | Age: 20
End: 2025-02-06
Payer: COMMERCIAL

## 2025-02-06 VITALS
SYSTOLIC BLOOD PRESSURE: 104 MMHG | OXYGEN SATURATION: 99 % | BODY MASS INDEX: 27.14 KG/M2 | HEART RATE: 79 BPM | WEIGHT: 153.2 LBS | TEMPERATURE: 98.2 F | HEIGHT: 63 IN | DIASTOLIC BLOOD PRESSURE: 66 MMHG

## 2025-02-06 DIAGNOSIS — G40.909 SEIZURE DISORDER (HCC): Primary | ICD-10-CM

## 2025-02-06 DIAGNOSIS — F84.0 AUTISM: ICD-10-CM

## 2025-02-06 DIAGNOSIS — Z02.89 ENCOUNTER FOR COMPLETION OF FORM WITH PATIENT: ICD-10-CM

## 2025-02-06 PROCEDURE — 99214 OFFICE O/P EST MOD 30 MIN: CPT | Performed by: STUDENT IN AN ORGANIZED HEALTH CARE EDUCATION/TRAINING PROGRAM

## 2025-02-06 NOTE — PROGRESS NOTES
"Name: Nicole Murry      : 2005      MRN: [id number]  Encounter Provider: Madisyn Dan DO  Encounter Date: 2025   Encounter department: [facility]  :  Assessment & Plan  Seizure disorder (HCC)  Reports last seizure 2018  Medications managed by neuro       Autism  Stable, doing well. Graduating from high school this year       Encounter for completion of form with patient  Completed form for [facility] [employer] [facility] (receives services at school), scanned in and returned to pt       Return in about 3 months (around 2025) for Annual physical.       History of Present Illness     Chief Complaint   Patient presents with    [facility]     Requesting Medication Evaluation form completion -- does have with her.        HPI    Accompanied by mother and sister today  History of autism, seizure disorder. Reports last seizure   Graduating from [employer] this year. Needs form completed for the [facility] [employer].  Receives occupational and speech therapy at school  UTD with dental and eye exams  Sleeps well  Generally balanced diet. Does not drink enough water  No exercise. Participates in gym class    Review of Systems   Constitutional:  Negative for chills and fever.   HENT:  Negative for congestion and sore throat.    Respiratory:  Negative for cough and shortness of breath.    Cardiovascular:  Negative for chest pain and palpitations.   Gastrointestinal:  Negative for abdominal pain, diarrhea, nausea and vomiting.   Genitourinary:  Negative for difficulty urinating and menstrual problem.   Neurological:  Negative for dizziness, seizures and headaches.   Psychiatric/Behavioral:  Negative for dysphoric mood and sleep disturbance.        Objective   /66 (BP Location: Left arm, Patient Position: Sitting, Cuff Size: Standard)   Pulse 79   Temp 98.2 °F (36.8 °C) (Temporal)   Ht 5' 3\" (1.6 m)   Wt 69.5 kg (153 lb 3.2 oz)   LMP  (LMP Unknown)   SpO2 99%   BMI 27.14 kg/m²      Physical " Exam  Vitals and nursing note reviewed.   Constitutional:       General: She is not in acute distress.     Appearance: She is well-developed.   HENT:      Head: Normocephalic and atraumatic.      Mouth/Throat:      Mouth: Mucous membranes are moist.      Pharynx: Oropharynx is clear. No posterior oropharyngeal erythema.   Eyes:      Extraocular Movements: Extraocular movements intact.      Conjunctiva/sclera: Conjunctivae normal.   Cardiovascular:      Rate and Rhythm: Normal rate and regular rhythm.      Heart sounds: No murmur heard.  Pulmonary:      Effort: Pulmonary effort is normal. No respiratory distress.      Breath sounds: Normal breath sounds.   Abdominal:      Palpations: Abdomen is soft.      Tenderness: There is no abdominal tenderness.   Musculoskeletal:         General: No swelling.      Cervical back: Neck supple.   Skin:     General: Skin is warm and dry.      Capillary Refill: Capillary refill takes less than 2 seconds.   Neurological:      Mental Status: She is alert.   Psychiatric:         Mood and Affect: Mood normal.

## 2025-02-27 ENCOUNTER — TELEPHONE (OUTPATIENT)
Age: 20
End: 2025-02-27

## 2025-02-27 NOTE — TELEPHONE ENCOUNTER
Pt called to say the form that was filled out is missing #15, the primary diagnosis needs to be changed to ID intellectual disability, #20a- ICF ID needs to be checked off instead. Pt will pick it up once corrected. Thank you.

## 2025-03-03 NOTE — TELEPHONE ENCOUNTER
Bin mother called regarding the form to be refilled out.patients mother will drop off the paper tomorrow.

## 2025-03-04 PROBLEM — F79 INTELLECTUAL DISABILITY: Status: ACTIVE | Noted: 2025-03-04

## 2025-07-22 ENCOUNTER — OFFICE VISIT (OUTPATIENT)
Dept: FAMILY MEDICINE CLINIC | Facility: CLINIC | Age: 20
End: 2025-07-22
Payer: COMMERCIAL

## 2025-07-22 VITALS
SYSTOLIC BLOOD PRESSURE: 106 MMHG | HEART RATE: 83 BPM | BODY MASS INDEX: 27.32 KG/M2 | WEIGHT: 154.2 LBS | TEMPERATURE: 98.2 F | OXYGEN SATURATION: 99 % | HEIGHT: 63 IN | DIASTOLIC BLOOD PRESSURE: 70 MMHG

## 2025-07-22 DIAGNOSIS — G40.309 NONINTRACTABLE GENERALIZED IDIOPATHIC EPILEPSY WITHOUT STATUS EPILEPTICUS (HCC): ICD-10-CM

## 2025-07-22 DIAGNOSIS — Z02.89 ENCOUNTER FOR COMPLETION OF FORM WITH PATIENT: ICD-10-CM

## 2025-07-22 DIAGNOSIS — Z11.59 NEED FOR HEPATITIS C SCREENING TEST: ICD-10-CM

## 2025-07-22 DIAGNOSIS — Z23 ENCOUNTER FOR IMMUNIZATION: ICD-10-CM

## 2025-07-22 DIAGNOSIS — Z13.220 SCREENING FOR LIPID DISORDERS: ICD-10-CM

## 2025-07-22 DIAGNOSIS — E55.9 VITAMIN D DEFICIENCY: ICD-10-CM

## 2025-07-22 DIAGNOSIS — R73.9 HYPERGLYCEMIA: ICD-10-CM

## 2025-07-22 DIAGNOSIS — Z00.00 ANNUAL PHYSICAL EXAM: Primary | ICD-10-CM

## 2025-07-22 DIAGNOSIS — Z13.228 SCREENING FOR ENDOCRINE, NUTRITIONAL, METABOLIC AND IMMUNITY DISORDER: ICD-10-CM

## 2025-07-22 DIAGNOSIS — Z13.21 SCREENING FOR ENDOCRINE, NUTRITIONAL, METABOLIC AND IMMUNITY DISORDER: ICD-10-CM

## 2025-07-22 DIAGNOSIS — Z13.0 SCREENING FOR ENDOCRINE, NUTRITIONAL, METABOLIC AND IMMUNITY DISORDER: ICD-10-CM

## 2025-07-22 DIAGNOSIS — Z11.1 TUBERCULOSIS SCREENING: ICD-10-CM

## 2025-07-22 DIAGNOSIS — F84.0 AUTISM: ICD-10-CM

## 2025-07-22 DIAGNOSIS — Z13.29 SCREENING FOR THYROID DISORDER: ICD-10-CM

## 2025-07-22 DIAGNOSIS — Z13.29 SCREENING FOR ENDOCRINE, NUTRITIONAL, METABOLIC AND IMMUNITY DISORDER: ICD-10-CM

## 2025-07-22 DIAGNOSIS — D50.9 IRON DEFICIENCY ANEMIA, UNSPECIFIED IRON DEFICIENCY ANEMIA TYPE: ICD-10-CM

## 2025-07-22 DIAGNOSIS — F79 INTELLECTUAL DISABILITY: ICD-10-CM

## 2025-07-22 PROCEDURE — 99395 PREV VISIT EST AGE 18-39: CPT | Performed by: STUDENT IN AN ORGANIZED HEALTH CARE EDUCATION/TRAINING PROGRAM

## 2025-07-22 PROCEDURE — 90651 9VHPV VACCINE 2/3 DOSE IM: CPT

## 2025-07-22 PROCEDURE — 90471 IMMUNIZATION ADMIN: CPT

## 2025-07-22 NOTE — PATIENT INSTRUCTIONS
"Patient Education     Routine physical for adults   The Basics   Written by the doctors and editors at [facility]   What is a physical? -- A physical is a routine visit, or \"check-up,\" with your doctor. You might also hear it called a \"wellness visit\" or \"preventive visit.\"  During each visit, the doctor will:   Ask about your physical and mental health   Ask about your habits, behaviors, and lifestyle   Do an exam   Give you vaccines if needed   Talk to you about any medicines you take   Give advice about your health   Answer your questions  Getting regular check-ups is an important part of taking care of your health. It can help your doctor find and treat any problems you have. But it's also important for preventing health problems.  A routine physical is different from a \"sick visit.\" A sick visit is when you see a doctor because of a health concern or problem. Since physicals are scheduled ahead of time, you can think about what you want to ask the doctor.  How often should I get a physical? -- It depends on your age and health. In general, for people age 21 years and older:   If you are younger than 50 years, you might be able to get a physical every 3 years.   If you are 50 years or older, your doctor might recommend a physical every year.  If you have an ongoing health condition, like diabetes or high blood pressure, your doctor will probably want to see you more often.  What happens during a physical? -- In general, each visit will include:   Physical exam - The doctor or nurse will check your height, weight, heart rate, and blood pressure. They will also look at your eyes and ears. They will ask about how you are feeling and whether you have any symptoms that bother you.   Medicines - It's a good idea to bring a list of all the medicines you take to each doctor visit. Your doctor will talk to you about your medicines and answer any questions. Tell them if you are having any side effects that bother you. You " "should also tell them if you are having trouble paying for any of your medicines.   Habits and behaviors - This includes:   Your diet   Your exercise habits   Whether you smoke, drink alcohol, or use drugs   Whether you are sexually active   Whether you feel safe at home  Your doctor will talk to you about things you can do to improve your health and lower your risk of health problems. They will also offer help and support. For example, if you want to quit smoking, they can give you advice and might prescribe medicines. If you want to improve your diet or get more physical activity, they can help you with this, too.   Lab tests, if needed - The tests you get will depend on your age and situation. For example, your doctor might want to check your:   Cholesterol   Blood sugar   Iron level   Vaccines - The recommended vaccines will depend on your age, health, and what vaccines you already had. Vaccines are very important because they can prevent certain serious or deadly infections.   Discussion of screening - \"Screening\" means checking for diseases or other health problems before they cause symptoms. Your doctor can recommend screening based on your age, risk, and preferences. This might include tests to check for:   Cancer, such as breast, prostate, cervical, ovarian, colorectal, prostate, lung, or skin cancer   Sexually transmitted infections, such as chlamydia and gonorrhea   Mental health conditions like depression and anxiety  Your doctor will talk to you about the different types of screening tests. They can help you decide which screenings to have. They can also explain what the results might mean.   Answering questions - The physical is a good time to ask the doctor or nurse questions about your health. If needed, they can refer you to other doctors or specialists, too.  Adults older than 65 years often need other care, too. As you get older, your doctor will talk to you about:   How to prevent falling at " home   Hearing or vision tests   Memory testing   How to take your medicines safely   Making sure that you have the help and support you need at home  All topics are updated as new evidence becomes available and our peer review process is complete.  This topic retrieved from [employer] on: May 02, 2024.  Topic 707815 Version 1.0  Release: 32.4.3 - C32.122  © 2024 UpToDate, Inc. and/or its affiliates. All rights reserved.  Consumer Information Use and Disclaimer   Disclaimer: This generalized information is a limited summary of diagnosis, treatment, and/or medication information. It is not meant to be comprehensive and should be used as a tool to help the user understand and/or assess potential diagnostic and treatment options. It does NOT include all information about conditions, treatments, medications, side effects, or risks that may apply to a specific patient. It is not intended to be medical advice or a substitute for the medical advice, diagnosis, or treatment of a health care provider based on the health care provider's examination and assessment of a patient's specific and unique circumstances. Patients must speak with a health care provider for complete information about their health, medical questions, and treatment options, including any risks or benefits regarding use of medications. This information does not endorse any treatments or medications as safe, effective, or approved for treating a specific patient. UpToDate, Inc. and its affiliates disclaim any warranty or liability relating to this information or the use thereof.The use of this information is governed by the Terms of Use, available at https://www.wolterswireLawyeruwer.com/en/know/clinical-effectiveness-terms. 2024© UpToDate, Inc. and its affiliates and/or licensors. All rights reserved.  Copyright   © 2024 UpToDate, Inc. and/or its affiliates. All rights reserved.

## 2025-07-22 NOTE — ASSESSMENT & PLAN NOTE
Last seizure was in 2018  Managed by neurologist, reviewed documentation and EEG from April.  Continue current medication regimen per neurologist  Patient's mother to request seizure protocol/action plan from neurologist for day program form

## 2025-07-22 NOTE — ASSESSMENT & PLAN NOTE
Will complete form for day program after lab results and neurology action plan seizure disorder are complete

## 2025-07-22 NOTE — ASSESSMENT & PLAN NOTE
No longer on oral iron supplement  Repeat labs  Orders:  •  CBC and differential; Future  •  TIBC Panel (incl. Iron, TIBC, % Iron Saturation); Future

## 2025-07-22 NOTE — ASSESSMENT & PLAN NOTE
Stable, recently graduated high school, no behavioral concerns, lives at home with family and is participating in a day program starting in August

## 2025-07-22 NOTE — PROGRESS NOTES
Adult Annual Physical  Name: Nicole Murry      : 2005      MRN: [id number]  Encounter Provider: Madisyn Dan DO  Encounter Date: 2025   Encounter department: [facility] [place]  Assessment & Plan  Annual physical exam  UTD dental and eye exams - wears glasses for distance (does not have at time of exam)  Routine labs  Offered gyn for routine women's health exams - pt's mother declined  Orders:  •  CBC and differential; Future  •  Comprehensive metabolic panel; Future  •  Lipid panel; Future  •  Hemoglobin A1C; Future  •  TSH, 3rd generation with Free T4 reflex; Future    Need for hepatitis C screening test    Orders:  •  Hepatitis C Antibody; Future    Tuberculosis screening  Not able to return in 2 days for PPD read, opts for QuantiFERON instead-orders placed  Orders:  •  Quantiferon TB Gold Plus Assay; Future    Encounter for completion of form with patient  Will complete form for day program after lab results and neurology action plan seizure disorder are complete       Vitamin D deficiency  Repeat vitamin D level  Orders:  •  Vitamin D 25 hydroxy; Future    Iron deficiency anemia, unspecified iron deficiency anemia type      No longer on oral iron supplement  Repeat labs  Orders:  •  CBC and differential; Future  •  TIBC Panel (incl. Iron, TIBC, % Iron Saturation); Future    Hyperglycemia  Repeat labs  Reviewed dietary and lifestyle modification  Orders:  •  Comprehensive metabolic panel; Future    Screening for thyroid disorder    Orders:  •  TSH, 3rd generation with Free T4 reflex; Future    Screening for endocrine, nutritional, metabolic and immunity disorder    Orders:  •  CBC and differential; Future  •  Comprehensive metabolic panel; Future  •  Lipid panel; Future  •  Hemoglobin A1C; Future  •  TSH, 3rd generation with Free T4 reflex; Future    Screening for lipid disorders    Orders:  •  Lipid panel; Future    Encounter for immunization  Provided vaccine counseling  Second dose  HPV vaccine administered today - received first dose < 15 years old, so only needs 2 doses - vaccines series competed today  Orders:  •  HPV VACCINE 9 VALENT IM    Nonintractable generalized idiopathic epilepsy without status epilepticus (HCC)  Last seizure was in 2018  Managed by neurologist, reviewed documentation and EEG from April.  Continue current medication regimen per neurologist  Patient's mother to request seizure protocol/action plan from neurologist for day program form       Autism  Stable, recently graduated high school, no behavioral concerns, lives at home with family and is participating in a day program starting in August       Intellectual disability  Stable, recently graduated high school, no behavioral concerns, lives at home with family and is participating in a day program starting in August           Preventive Screenings:  - Diabetes Screening: orders placed  - Cholesterol Screening: orders placed   - Hepatitis C screening: orders placed   - Cervical cancer screening: risks/benefits discussed and patient declines   - Colon cancer screening: screening not indicated   - Lung cancer screening: screening not indicated     Immunizations:  - Immunizations due: HPV (Gardasil 9)  - Risks/benefits immunizations discussed    - Immunizations given per orders      Counseling/Anticipatory Guidance:  - Alcohol: discussed moderation in alcohol intake and recommendations for healthy alcohol use.   - Drug use: discussed harms of illicit drug use and how it can negatively impact mental/physical health.   - Tobacco use: discussed harms of tobacco use and management options for quitting.   - Dental health: discussed importance of regular tooth brushing, flossing, and dental visits.   - Sexual health: discussed sexually transmitted diseases, partner selection, use of condoms, avoidance of unintended pregnancy, and contraceptive alternatives.   - Diet: discussed recommendations for a healthy/well-balanced diet.    - Exercise: the importance of regular exercise/physical activity was discussed. Recommend exercise 3-5 times per week for at least 30 minutes.   - Injury prevention: discussed safety/seat belts, safety helmets, smoke detectors, carbon monoxide detectors, and smoking near bedding or upholstery.          History of Present Illness       Chief Complaint   Patient presents with   • Annual Exam     Comes with [facility] Annual Physical 2 page Form      Here with mother and younger sister today  Nicole is feeling well, no acute complaints or concerns  Recently graduated high school  Needs form completed for day program - starts 8/18    Adult Annual Physical:  Patient presents for annual physical.     Diet and Physical Activity:  - Diet/Nutrition: well balanced diet.  - Exercise: no formal exercise.    General Health:  - Sleep: sleeps well and 7-8 hours of sleep on average.  - Hearing: normal hearing bilateral ears.  - Vision: wears glasses and most recent eye exam < 1 year ago.  - Dental: regular dental visits.    /GYN Health:  - Follows with GYN: no.   - Last menstrual cycle: 7/13/2025.     Review of Systems   Constitutional:  Negative for chills, fatigue and fever.   HENT:  Negative for congestion and sore throat.    Eyes:  Negative for visual disturbance.   Respiratory:  Negative for cough, shortness of breath and wheezing.    Cardiovascular:  Negative for chest pain and palpitations.   Gastrointestinal:  Negative for abdominal pain, constipation, diarrhea, nausea and vomiting.   Endocrine: Negative for cold intolerance and heat intolerance.   Genitourinary:  Negative for decreased urine volume and difficulty urinating.   Musculoskeletal:  Negative for arthralgias and myalgias.   Skin:  Negative for rash.   Neurological:  Negative for dizziness, seizures, light-headedness and headaches.   Psychiatric/Behavioral:  Negative for dysphoric mood and sleep disturbance.          Objective   /70 (BP Location: Left arm,  "Patient Position: Sitting, Cuff Size: Standard)   Pulse 83   Temp 98.2 °F (36.8 °C) (Temporal)   Ht 5' 3\" (1.6 m)   Wt 69.9 kg (154 lb 3.2 oz)   SpO2 99%   BMI 27.32 kg/m²     Vision Screening    Right eye Left eye Both eyes   Without correction 20/40 20/40 20/40   With correction          Physical Exam  Vitals and nursing note reviewed.   Constitutional:       General: She is not in acute distress.     Appearance: She is well-developed. She is not ill-appearing.   HENT:      Head: Normocephalic and atraumatic.      Mouth/Throat:      Mouth: Mucous membranes are moist.      Pharynx: Oropharynx is clear. No posterior oropharyngeal erythema.     Eyes:      Extraocular Movements: Extraocular movements intact.      Conjunctiva/sclera: Conjunctivae normal.     Neck:      Comments: No thyromegaly  Cardiovascular:      Rate and Rhythm: Normal rate and regular rhythm.      Pulses: Normal pulses.      Heart sounds: No murmur heard.  Pulmonary:      Effort: Pulmonary effort is normal. No respiratory distress.      Breath sounds: Normal breath sounds. No wheezing or rales.   Abdominal:      General: There is no distension.      Palpations: Abdomen is soft.      Tenderness: There is no abdominal tenderness.     Musculoskeletal:      Cervical back: Neck supple.      Right lower leg: No edema.      Left lower leg: No edema.   Lymphadenopathy:      Cervical: No cervical adenopathy.     Skin:     General: Skin is warm and dry.      Capillary Refill: Capillary refill takes less than 2 seconds.     Neurological:      Mental Status: She is alert.     Psychiatric:      Comments: Calm, cooperative, answers simple questions         "

## 2025-07-22 NOTE — ASSESSMENT & PLAN NOTE
Repeat labs  Reviewed dietary and lifestyle modification  Orders:  •  Comprehensive metabolic panel; Future

## 2025-07-23 ENCOUNTER — APPOINTMENT (OUTPATIENT)
Dept: LAB | Facility: HOSPITAL | Age: 20
End: 2025-07-23
Attending: STUDENT IN AN ORGANIZED HEALTH CARE EDUCATION/TRAINING PROGRAM
Payer: COMMERCIAL

## 2025-07-23 DIAGNOSIS — Z13.0 SCREENING FOR ENDOCRINE, NUTRITIONAL, METABOLIC AND IMMUNITY DISORDER: ICD-10-CM

## 2025-07-23 DIAGNOSIS — Z11.1 TUBERCULOSIS SCREENING: ICD-10-CM

## 2025-07-23 DIAGNOSIS — R73.9 HYPERGLYCEMIA: ICD-10-CM

## 2025-07-23 DIAGNOSIS — Z13.228 SCREENING FOR ENDOCRINE, NUTRITIONAL, METABOLIC AND IMMUNITY DISORDER: ICD-10-CM

## 2025-07-23 DIAGNOSIS — Z13.21 SCREENING FOR ENDOCRINE, NUTRITIONAL, METABOLIC AND IMMUNITY DISORDER: ICD-10-CM

## 2025-07-23 DIAGNOSIS — Z13.220 SCREENING FOR LIPID DISORDERS: ICD-10-CM

## 2025-07-23 DIAGNOSIS — Z00.00 ANNUAL PHYSICAL EXAM: ICD-10-CM

## 2025-07-23 DIAGNOSIS — E55.9 VITAMIN D DEFICIENCY: ICD-10-CM

## 2025-07-23 DIAGNOSIS — Z13.29 SCREENING FOR ENDOCRINE, NUTRITIONAL, METABOLIC AND IMMUNITY DISORDER: ICD-10-CM

## 2025-07-23 DIAGNOSIS — Z11.59 NEED FOR HEPATITIS C SCREENING TEST: ICD-10-CM

## 2025-07-23 DIAGNOSIS — D50.9 IRON DEFICIENCY ANEMIA, UNSPECIFIED IRON DEFICIENCY ANEMIA TYPE: ICD-10-CM

## 2025-07-23 DIAGNOSIS — Z13.29 SCREENING FOR THYROID DISORDER: ICD-10-CM

## 2025-07-23 LAB
25(OH)D3 SERPL-MCNC: 21.4 NG/ML (ref 30–100)
ALBUMIN SERPL BCG-MCNC: 4.7 G/DL (ref 3.5–5)
ALP SERPL-CCNC: 43 U/L (ref 34–104)
ALT SERPL W P-5'-P-CCNC: 7 U/L (ref 7–52)
ANION GAP SERPL CALCULATED.3IONS-SCNC: 10 MMOL/L (ref 4–13)
AST SERPL W P-5'-P-CCNC: 11 U/L (ref 13–39)
BASOPHILS # BLD AUTO: 0.04 THOUSANDS/ÂΜL (ref 0–0.1)
BASOPHILS NFR BLD AUTO: 1 % (ref 0–1)
BILIRUB SERPL-MCNC: 0.42 MG/DL (ref 0.2–1)
BUN SERPL-MCNC: 14 MG/DL (ref 5–25)
CALCIUM SERPL-MCNC: 9.6 MG/DL (ref 8.4–10.2)
CHLORIDE SERPL-SCNC: 109 MMOL/L (ref 96–108)
CHOLEST SERPL-MCNC: 174 MG/DL (ref ?–200)
CO2 SERPL-SCNC: 22 MMOL/L (ref 21–32)
CREAT SERPL-MCNC: 0.73 MG/DL (ref 0.6–1.3)
EOSINOPHIL # BLD AUTO: 0.08 THOUSAND/ÂΜL (ref 0–0.61)
EOSINOPHIL NFR BLD AUTO: 1 % (ref 0–6)
ERYTHROCYTE [DISTWIDTH] IN BLOOD BY AUTOMATED COUNT: 13.5 % (ref 11.6–15.1)
EST. AVERAGE GLUCOSE BLD GHB EST-MCNC: 111 MG/DL
GFR SERPL CREATININE-BSD FRML MDRD: 117 ML/MIN/1.73SQ M
GLUCOSE P FAST SERPL-MCNC: 92 MG/DL (ref 65–99)
HBA1C MFR BLD: 5.5 %
HCT VFR BLD AUTO: 41.6 % (ref 34.8–46.1)
HCV AB SER QL: NORMAL
HDLC SERPL-MCNC: 57 MG/DL
HGB BLD-MCNC: 13.2 G/DL (ref 11.5–15.4)
IMM GRANULOCYTES # BLD AUTO: 0.03 THOUSAND/UL (ref 0–0.2)
IMM GRANULOCYTES NFR BLD AUTO: 0 % (ref 0–2)
IRON SATN MFR SERPL: 36 % (ref 15–50)
IRON SERPL-MCNC: 115 UG/DL (ref 50–212)
LDLC SERPL CALC-MCNC: 102 MG/DL (ref 0–100)
LYMPHOCYTES # BLD AUTO: 2.71 THOUSANDS/ÂΜL (ref 0.6–4.47)
LYMPHOCYTES NFR BLD AUTO: 37 % (ref 14–44)
MCH RBC QN AUTO: 28.2 PG (ref 26.8–34.3)
MCHC RBC AUTO-ENTMCNC: 31.7 G/DL (ref 31.4–37.4)
MCV RBC AUTO: 89 FL (ref 82–98)
MONOCYTES # BLD AUTO: 0.43 THOUSAND/ÂΜL (ref 0.17–1.22)
MONOCYTES NFR BLD AUTO: 6 % (ref 4–12)
NEUTROPHILS # BLD AUTO: 4 THOUSANDS/ÂΜL (ref 1.85–7.62)
NEUTS SEG NFR BLD AUTO: 55 % (ref 43–75)
NONHDLC SERPL-MCNC: 117 MG/DL
NRBC BLD AUTO-RTO: 0 /100 WBCS
PLATELET # BLD AUTO: 215 THOUSANDS/UL (ref 149–390)
PMV BLD AUTO: 10.9 FL (ref 8.9–12.7)
POTASSIUM SERPL-SCNC: 3.9 MMOL/L (ref 3.5–5.3)
PROT SERPL-MCNC: 7.4 G/DL (ref 6.4–8.4)
RBC # BLD AUTO: 4.68 MILLION/UL (ref 3.81–5.12)
SODIUM SERPL-SCNC: 141 MMOL/L (ref 135–147)
TIBC SERPL-MCNC: 320.6 UG/DL (ref 250–450)
TRANSFERRIN SERPL-MCNC: 229 MG/DL (ref 203–362)
TRIGL SERPL-MCNC: 75 MG/DL (ref ?–150)
TSH SERPL DL<=0.05 MIU/L-ACNC: 3.58 UIU/ML (ref 0.45–4.5)
UIBC SERPL-MCNC: 206 UG/DL (ref 155–355)
WBC # BLD AUTO: 7.29 THOUSAND/UL (ref 4.31–10.16)

## 2025-07-23 PROCEDURE — 84443 ASSAY THYROID STIM HORMONE: CPT

## 2025-07-23 PROCEDURE — 83540 ASSAY OF IRON: CPT

## 2025-07-23 PROCEDURE — 85025 COMPLETE CBC W/AUTO DIFF WBC: CPT

## 2025-07-23 PROCEDURE — 86480 TB TEST CELL IMMUN MEASURE: CPT

## 2025-07-23 PROCEDURE — 83036 HEMOGLOBIN GLYCOSYLATED A1C: CPT

## 2025-07-23 PROCEDURE — 82306 VITAMIN D 25 HYDROXY: CPT

## 2025-07-23 PROCEDURE — 80061 LIPID PANEL: CPT

## 2025-07-23 PROCEDURE — 80053 COMPREHEN METABOLIC PANEL: CPT

## 2025-07-23 PROCEDURE — 83550 IRON BINDING TEST: CPT

## 2025-07-23 PROCEDURE — 86803 HEPATITIS C AB TEST: CPT

## 2025-07-23 PROCEDURE — 36415 COLL VENOUS BLD VENIPUNCTURE: CPT

## 2025-07-24 ENCOUNTER — RESULTS FOLLOW-UP (OUTPATIENT)
Dept: FAMILY MEDICINE CLINIC | Facility: CLINIC | Age: 20
End: 2025-07-24

## 2025-07-24 LAB
GAMMA INTERFERON BACKGROUND BLD IA-ACNC: 0.03 IU/ML
M TB IFN-G BLD-IMP: NEGATIVE
M TB IFN-G CD4+ BCKGRND COR BLD-ACNC: 0.01 IU/ML
M TB IFN-G CD4+ BCKGRND COR BLD-ACNC: 0.01 IU/ML
MITOGEN IGNF BCKGRD COR BLD-ACNC: 1.47 IU/ML

## 2025-07-24 NOTE — TELEPHONE ENCOUNTER
Spoke with patients mother and gave results as well as PCP message. Advised that form can be picked up at their convenience.

## 2025-07-24 NOTE — TELEPHONE ENCOUNTER
----- Message from Madisyn Dan DO sent at 7/24/2025  2:07 PM EDT -----  Please let patient's mother know vitamin D level is borderline-recommend over-the-counter 2000 units daily  Cholesterol mildly elevated but nothing to worry about  Remainder of labs look good  Completed form that she can come   ----- Message -----  From: Lab, Background User  Sent: 7/23/2025   9:30 AM EDT  To: Madisyn Dan DO